# Patient Record
Sex: FEMALE | NOT HISPANIC OR LATINO | Employment: FULL TIME | URBAN - METROPOLITAN AREA
[De-identification: names, ages, dates, MRNs, and addresses within clinical notes are randomized per-mention and may not be internally consistent; named-entity substitution may affect disease eponyms.]

---

## 2018-04-03 ENCOUNTER — EVALUATION (OUTPATIENT)
Dept: PHYSICAL THERAPY | Facility: CLINIC | Age: 58
End: 2018-04-03
Payer: COMMERCIAL

## 2018-04-03 DIAGNOSIS — M25.551 RIGHT HIP PAIN: ICD-10-CM

## 2018-04-03 DIAGNOSIS — M25.511 RIGHT SHOULDER PAIN, UNSPECIFIED CHRONICITY: ICD-10-CM

## 2018-04-03 DIAGNOSIS — M54.2 NECK PAIN: Primary | ICD-10-CM

## 2018-04-03 PROCEDURE — 97140 MANUAL THERAPY 1/> REGIONS: CPT | Performed by: PHYSICAL THERAPIST

## 2018-04-03 PROCEDURE — 97110 THERAPEUTIC EXERCISES: CPT | Performed by: PHYSICAL THERAPIST

## 2018-04-03 PROCEDURE — G8982 BODY POS GOAL STATUS: HCPCS | Performed by: PHYSICAL THERAPIST

## 2018-04-03 PROCEDURE — 97162 PT EVAL MOD COMPLEX 30 MIN: CPT | Performed by: PHYSICAL THERAPIST

## 2018-04-03 PROCEDURE — G8981 BODY POS CURRENT STATUS: HCPCS | Performed by: PHYSICAL THERAPIST

## 2018-04-03 PROCEDURE — 97014 ELECTRIC STIMULATION THERAPY: CPT | Performed by: PHYSICAL THERAPIST

## 2018-04-03 NOTE — LETTER
2018    [address] [place] [id number]    Patient: Omer Powell   YOB: 1960   Date of Visit: 4/3/2018     Encounter Diagnosis     ICD-10-CM    1  Neck pain M54 2    2  Right shoulder pain, unspecified chronicity M25 511    3  Right hip pain M25 551        Dear Dr Caren Bowden:    Please review the attached Plan of Care from [facility] recent visit  Please verify that you agree therapy should continue by signing the attached document and sending it back to our office  If you have any questions or concerns, please don't hesitate to call  Sincerely,    Paris Shannon, PT      Referring Provider:      I certify that I have read the below Plan of Care and certify the need for these services furnished under this plan of treatment while under my care  Selam Medrano  [address] [place] [address]  VIA Facsimile: [phone]          PT Evaluation     Today's date: 4/3/2018  Patient name: Omer Powell  : 1960  MRN: [id number]  Referring provider: Maria Henry  Dx:   Encounter Diagnosis     ICD-10-CM    1  Neck pain M54 2    2  Right shoulder pain, unspecified chronicity M25 511    3  Right hip pain M25 551                   Assessment/Plan    Subjective:  Pt is a [address] old female who present to PT w/ neck, right shoulder and right hip pain  She reports she has been having this ongoing pain for some time  She reports her neck and right shoulder pain seem to occur together, and worsen when she is sitting at her desk for prolonged periods of time  She also notes she is unable to sleep on the affected shoulder as well  She reports she feels less of this neck and shoulder pain when she is not working  The right hip pain is ongoing  It is located along the course of the right superior lateral hip, above the area of the Gt  This pain is worse with walking or sitting from prolonged sitting    She also feels she is unable to get comfortable at night to sleep if she lays on the affected side  The neck pain is located along the course of the right UT and LC muscles  Objective     1  The patient reports pain scale as 3-7/10   --Description:  Sharp, achy, tight   --Aggravated:  Prolonged sitting at her work station   --Relieved:  Resting or not working   2  Patient observation reveals forward head and accentuated lordosis   3  Palpation for pain was positive over the right UT and LS muscles; C6 Sp; right GT bursa   4  Reflex testing revealed the following:    --C5:  2+/2+   --C6:  2+/2+   --C7:  2+/2+  5  Manual muscle testing revealed the following:    --C5:  4/5 / 4/5   --C6:  4/5 / 4/5   --C7:  4-/5 / 4-/5  6  Sensation was intact to light touch  7   Functional testing revealed the patient's Neck Disability Index to be 22/50   8  Upper Quarter Screen was intact for all myotomes tested  9   Active Range of Motion of the cervical spine was as follows:    --Flexion:  -2" chin to chest w/ mild right stretch pain w/ OP   --Extension:  -50% w/ mild increased right sided neck and shoulder pain w/ quadrant OP   --Right sidebending:  -50% w/ increased shoulder pain w/ OP   --Left sidebending:  -50% w/ increased right tightness w/ OP   --Right rotation:  -50% w/ increased right shoulder pain w/ OP   --Left rotation:  -25% w/ mild right tightness w/ OP  10   Special testing revealed the following:    --Acute Radiculopathy Screen:    1  Spurling's:  +/-    2  Distraction:  -/-    3   ULTT:  -/-   --Shoulder Screen:  +/-    Patient's rehabilitation potential is good to achieve the following functional goals by attending physical therapy for 12 visits:  1  The patient will report their pain to be 0-1/10   2  The patient will be able to sit for at least 30 minutes without complaints of increased symptoms  3   The patient will be able to perform all ADL's independently and without fear of re-injury    4   The patient will be able to rise from a seated position one time every 30 minutes without difficulty or increased pain  5   The patient will be able to perform all work requirements without restrictions  6   The patient will be able to lift at least 25 pounds from the floor to waist level one time every 30 minutes without any difficulty or fear of re-injury  7   The patient will be able to sleep at least 6 hours undisturbed each night  8   The patient will be able to return to all reasonable leisure activities without restrictions  9   The patient's will have negative palpation for tenderness  10   The patient's manual muscle test will be within normal limits for all myotomes tested  11  The patient's active range of motion will be within normal limits for all ranges tested  12  The patient's Neck Disability Index will be 0-5/50  13  The patient will be independent with their home exercise program     Assessment:     Thank you for the recent referral of Aspen Arce  As you know, this patient is a 56 y o  female who desires to return to a pain-free and fully functional lifestyle  Physical examination has found the patient to have Movement Impairment Diagnosis extension dysfunction that has resulted in pathoanatomical symptoms of Neck and shoulder pain associated with multiple functional limitations  Etiological variables appear to be postural, age-related, and vocational in nature  Rehabilitation goals include decreasing pain, increasing range of motion, increasing strength, improving biomechanics, endurance, posture, and functional tolerances to be within normal limits and consistent with patient's activities of daily living  In addition, the patient will be instructed in proper and safe body mechanics and provided an independent home exercise program to complement their outpatient physical therapy  I sincerely enjoyed participating in the care of this patient        Plan: treatment will include, but not be limited to the following:    Cryotherapy and/or Hot packs, Electrical stimulation and Mechanical traction to control for pain, inflammation, and/or edema  Joint Mobilization Techniques, Soft Tissue Mobilization Techniques and PROM/AAROM/AROM exercises to restore joint mobility/flexibility  Stabilization/strengthening exercises to restore core stability/strength  Instruction and Progression of a home exercise program to address the above impairments  Postural education  Ergonomic/ training  I have fully discussed the above treatment plan and expected outcomes with the patient  She is aware of the diagnosis and prognosis and has voluntarily agreed to participate in physical and/or occupational therapy            Total Time: 60      Daily Treatment Diary     Manual  4/2            AP C/S 10'            TPR R UT and GM 10'            CPA C6-T2 10'                                          Exercise Diary              SOC 15x:10            DNF 10x:05            Rot R & L 10x:05            PF str 3x:30            No $ 2x15                                                                                                                                                                                                                   Modalities              MH/TENS 15'

## 2018-04-03 NOTE — PROGRESS NOTES
PT Evaluation     Today's date: 4/3/2018  Patient name: Omer Powell  : 1960  MRN: [id number]  Referring provider: Maria Henry  Dx:   Encounter Diagnosis     ICD-10-CM    1  Neck pain M54 2    2  Right shoulder pain, unspecified chronicity M25 511    3  Right hip pain M25 551                   Assessment/Plan    Subjective:  Pt is a 56year old female who present to PT w/ neck, right shoulder and right hip pain  She reports she has been having this ongoing pain for some time  She reports her neck and right shoulder pain seem to occur together, and worsen when she is sitting at her desk for prolonged periods of time  She also notes she is unable to sleep on the affected shoulder as well  She reports she feels less of this neck and shoulder pain when she is not working  The right hip pain is ongoing  It is located along the course of the right superior lateral hip, above the area of the Gt  This pain is worse with walking or sitting from prolonged sitting  She also feels she is unable to get comfortable at night to sleep if she lays on the affected side  The neck pain is located along the course of the right UT and LC muscles  Objective     1  The patient reports pain scale as 3-7/10   --Description:  Sharp, achy, tight   --Aggravated:  Prolonged sitting at her work station   --Relieved:  Resting or not working   2  Patient observation reveals forward head and accentuated lordosis   3  Palpation for pain was positive over the right UT and LS muscles; C6 Sp; right GT bursa   4  Reflex testing revealed the following:    --C5:  2+/2+   --C6:  2+/2+   --C7:  2+/2+  5  Manual muscle testing revealed the following:    --C5:  4/5 / 4/5   --C6:  4/5 / 4/5   --C7:  4-/5 / 4-/5  6  Sensation was intact to light touch  7   Functional testing revealed the patient's Neck Disability Index to be 22/50   8  Upper Quarter Screen was intact for all myotomes tested    9   Active Range of Motion of the cervical spine was as follows:    --Flexion:  -2" chin to chest w/ mild right stretch pain w/ OP   --Extension:  -50% w/ mild increased right sided neck and shoulder pain w/ quadrant OP   --Right sidebending:  -50% w/ increased shoulder pain w/ OP   --Left sidebending:  -50% w/ increased right tightness w/ OP   --Right rotation:  -50% w/ increased right shoulder pain w/ OP   --Left rotation:  -25% w/ mild right tightness w/ OP  10   Special testing revealed the following:    --Acute Radiculopathy Screen:    1  Spurling's:  +/-    2  Distraction:  -/-    3   ULTT:  -/-   --Shoulder Screen:  +/-    Patient's rehabilitation potential is good to achieve the following functional goals by attending physical therapy for 12 visits:  1  The patient will report their pain to be 0-1/10   2  The patient will be able to sit for at least 30 minutes without complaints of increased symptoms  3   The patient will be able to perform all ADL's independently and without fear of re-injury  4   The patient will be able to rise from a seated position one time every 30 minutes without difficulty or increased pain  5   The patient will be able to perform all work requirements without restrictions  6   The patient will be able to lift at least 25 pounds from the floor to waist level one time every 30 minutes without any difficulty or fear of re-injury  7   The patient will be able to sleep at least 6 hours undisturbed each night  8   The patient will be able to return to all reasonable leisure activities without restrictions  9   The patient's will have negative palpation for tenderness  10   The patient's manual muscle test will be within normal limits for all myotomes tested  11  The patient's active range of motion will be within normal limits for all ranges tested  12  The patient's Neck Disability Index will be 0-5/50  13    The patient will be independent with their home exercise program     Assessment:     Thank you for the recent referral of Mar Figueroa  As you know, this patient is a 56 y o  female who desires to return to a pain-free and fully functional lifestyle  Physical examination has found the patient to have Movement Impairment Diagnosis extension dysfunction that has resulted in pathoanatomical symptoms of Neck and shoulder pain associated with multiple functional limitations  Etiological variables appear to be postural, age-related, and vocational in nature  Rehabilitation goals include decreasing pain, increasing range of motion, increasing strength, improving biomechanics, endurance, posture, and functional tolerances to be within normal limits and consistent with patient's activities of daily living  In addition, the patient will be instructed in proper and safe body mechanics and provided an independent home exercise program to complement their outpatient physical therapy  I sincerely enjoyed participating in the care of this patient  Plan: treatment will include, but not be limited to the following:    Cryotherapy and/or Hot packs, Electrical stimulation and Mechanical traction to control for pain, inflammation, and/or edema  Joint Mobilization Techniques, Soft Tissue Mobilization Techniques and PROM/AAROM/AROM exercises to restore joint mobility/flexibility  Stabilization/strengthening exercises to restore core stability/strength  Instruction and Progression of a home exercise program to address the above impairments  Postural education  Ergonomic/ training  I have fully discussed the above treatment plan and expected outcomes with the patient  She is aware of the diagnosis and prognosis and has voluntarily agreed to participate in physical and/or occupational therapy            Total Time: 60      Daily Treatment Diary     Manual  4/2            AP C/S 10'            TPR R UT and GM 10'            CPA C6-T2 10'                                          Exercise Diary SOC 15x:10            DNF 10x:05            Rot R & L 10x:05            PF str 3x:30            No $ 2x15                                                                                                                                                                                                                   Modalities              MH/TENS 15'

## 2018-04-03 NOTE — LETTER
2018    [address] [place] [address]  [address]  [facility] [address]    Patient: Omer Powell   YOB: 1960   Date of Visit: 4/3/2018     Encounter Diagnosis     ICD-10-CM    1  Neck pain M54 2    2  Right shoulder pain, unspecified chronicity M25 511    3  Right hip pain M25 551        Dear Dr Caren Bowden:    Please review the attached Plan of Care from [facility] recent visit  Please verify that you agree therapy should continue by signing the attached document and sending it back to our office  If you have any questions or concerns, please don't hesitate to call  Sincerely,    Paris Shannon, PT      Referring Provider:      I certify that I have read the below Plan of Care and certify the need for these services furnished under this plan of treatment while under my care  [address] [place] [address]  [address] [place] [address]  VIA Facsimile: [phone]          PT Evaluation     Today's date: 4/3/2018  Patient name: Omer Powell  : 1960  MRN: [id number]  Referring provider: Maria Henry  Dx:   Encounter Diagnosis     ICD-10-CM    1  Neck pain M54 2    2  Right shoulder pain, unspecified chronicity M25 511    3  Right hip pain M25 551                   Assessment/Plan    Subjective:  Pt is a 56year old female who present to PT w/ neck, right shoulder and right hip pain  She reports she has been having this ongoing pain for some time  She reports her neck and right shoulder pain seem to occur together, and worsen when she is sitting at her desk for prolonged periods of time  She also notes she is unable to sleep on the affected shoulder as well  She reports she feels less of this neck and shoulder pain when she is not working  The right hip pain is ongoing  It is located along the course of the right superior lateral hip, above the area of the Gt  This pain is worse with walking or sitting from prolonged sitting    She also feels she is unable to get comfortable at night to sleep if she lays on the affected side  The neck pain is located along the course of the right UT and LC muscles  Objective     1  The patient reports pain scale as 3-7/10   --Description:  Sharp, achy, tight   --Aggravated:  Prolonged sitting at her work station   --Relieved:  Resting or not working   2  Patient observation reveals forward head and accentuated lordosis   3  Palpation for pain was positive over the right UT and LS muscles; C6 Sp; right GT bursa   4  Reflex testing revealed the following:    --C5:  2+/2+   --C6:  2+/2+   --C7:  2+/2+  5  Manual muscle testing revealed the following:    --C5:  4/5 / 4/5   --C6:  4/5 / 4/5   --C7:  4-/5 / 4-/5  6  Sensation was intact to light touch  7   Functional testing revealed the patient's Neck Disability Index to be 22/50   8  Upper Quarter Screen was intact for all myotomes tested  9   Active Range of Motion of the cervical spine was as follows:    --Flexion:  -2" chin to chest w/ mild right stretch pain w/ OP   --Extension:  -50% w/ mild increased right sided neck and shoulder pain w/ quadrant OP   --Right sidebending:  -50% w/ increased shoulder pain w/ OP   --Left sidebending:  -50% w/ increased right tightness w/ OP   --Right rotation:  -50% w/ increased right shoulder pain w/ OP   --Left rotation:  -25% w/ mild right tightness w/ OP  10   Special testing revealed the following:    --Acute Radiculopathy Screen:    1  Spurling's:  +/-    2  Distraction:  -/-    3   ULTT:  -/-   --Shoulder Screen:  +/-    Patient's rehabilitation potential is good to achieve the following functional goals by attending physical therapy for 12 visits:  1  The patient will report their pain to be 0-1/10   2  The patient will be able to sit for at least 30 minutes without complaints of increased symptoms  3   The patient will be able to perform all ADL's independently and without fear of re-injury    4   The patient will be able to rise from a seated position one time every 30 minutes without difficulty or increased pain  5   The patient will be able to perform all work requirements without restrictions  6   The patient will be able to lift at least 25 pounds from the floor to waist level one time every 30 minutes without any difficulty or fear of re-injury  7   The patient will be able to sleep at least 6 hours undisturbed each night  8   The patient will be able to return to all reasonable leisure activities without restrictions  9   The patient's will have negative palpation for tenderness  10   The patient's manual muscle test will be within normal limits for all myotomes tested  11  The patient's active range of motion will be within normal limits for all ranges tested  12  The patient's Neck Disability Index will be 0-5/50  13  The patient will be independent with their home exercise program     Assessment:     Thank you for the recent referral of Aspen Arce  As you know, this patient is a 56 y o  female who desires to return to a pain-free and fully functional lifestyle  Physical examination has found the patient to have Movement Impairment Diagnosis extension dysfunction that has resulted in pathoanatomical symptoms of Neck and shoulder pain associated with multiple functional limitations  Etiological variables appear to be postural, age-related, and vocational in nature  Rehabilitation goals include decreasing pain, increasing range of motion, increasing strength, improving biomechanics, endurance, posture, and functional tolerances to be within normal limits and consistent with patient's activities of daily living  In addition, the patient will be instructed in proper and safe body mechanics and provided an independent home exercise program to complement their outpatient physical therapy  I sincerely enjoyed participating in the care of this patient        Plan: treatment will include, but not be limited to the following:    Cryotherapy and/or Hot packs, Electrical stimulation and Mechanical traction to control for pain, inflammation, and/or edema  Joint Mobilization Techniques, Soft Tissue Mobilization Techniques and PROM/AAROM/AROM exercises to restore joint mobility/flexibility  Stabilization/strengthening exercises to restore core stability/strength  Instruction and Progression of a home exercise program to address the above impairments  Postural education  Ergonomic/ training  I have fully discussed the above treatment plan and expected outcomes with the patient  She is aware of the diagnosis and prognosis and has voluntarily agreed to participate in physical and/or occupational therapy            Total Time: 60      Daily Treatment Diary     Manual  4/2            AP C/S 10'            TPR R UT and GM 10'            CPA C6-T2 10'                                          Exercise Diary              SOC 15x:10            DNF 10x:05            Rot R & L 10x:05            PF str 3x:30            No $ 2x15                                                                                                                                                                                                                   Modalities              MH/TENS 15'

## 2018-04-04 ENCOUNTER — TRANSCRIBE ORDERS (OUTPATIENT)
Dept: PHYSICAL THERAPY | Facility: CLINIC | Age: 58
End: 2018-04-04

## 2018-04-04 ENCOUNTER — OFFICE VISIT (OUTPATIENT)
Dept: PHYSICAL THERAPY | Facility: CLINIC | Age: 58
End: 2018-04-04
Payer: COMMERCIAL

## 2018-04-04 DIAGNOSIS — M54.2 NECK PAIN: Primary | ICD-10-CM

## 2018-04-04 DIAGNOSIS — M25.511 RIGHT SHOULDER PAIN, UNSPECIFIED CHRONICITY: ICD-10-CM

## 2018-04-04 DIAGNOSIS — M25.551 RIGHT HIP PAIN: ICD-10-CM

## 2018-04-04 PROCEDURE — 97014 ELECTRIC STIMULATION THERAPY: CPT | Performed by: PHYSICAL THERAPIST

## 2018-04-04 PROCEDURE — 97110 THERAPEUTIC EXERCISES: CPT | Performed by: PHYSICAL THERAPIST

## 2018-04-04 PROCEDURE — 97140 MANUAL THERAPY 1/> REGIONS: CPT | Performed by: PHYSICAL THERAPIST

## 2018-04-04 NOTE — PROGRESS NOTES
Daily Note     Today's date: 2018  Patient name: Junior Estrada  : 1960  MRN: [id number]  Referring provider: Kj Herrera  Dx:   Encounter Diagnosis     ICD-10-CM    1  Neck pain M54 2    2  Right shoulder pain, unspecified chronicity M25 511    3  Right hip pain M25 551                 Visit 2    Subjective: Pt reports she felt really good, but her position at work is what really increases her pain  Objective: See treatment diary below  The patient performed the following as per flow sheet:   --Therapeutic Exercises for 20 minutes  --Manual Therapy for 30 minutes  --Moist Heat and TENS for 15 minutes  Assessment: Pt progress is good  Once we get her symptoms to calm down a little, adjusting her work station will be the thing which will continue to keep her symptoms manageable and pain less so she can work without too much discomfort  Plan: Continue per plan of care       Manual                       AP C/S 10'                     TPR R UT and GM 10'                     CPA C6-T2 10'                                                                           Exercise Diary                        SOC 15x:10                     DNF 10x:05                     Rot R & L 10x:05                     PF str 3x:30                     No $ 2x15                     Sup protr 2x15                     FR str 2'                                                                                                                                                                                                                                                                                                                                                   Modalities                        MH/TENS 13'

## 2018-04-09 ENCOUNTER — OFFICE VISIT (OUTPATIENT)
Dept: PHYSICAL THERAPY | Facility: CLINIC | Age: 58
End: 2018-04-09
Payer: COMMERCIAL

## 2018-04-09 DIAGNOSIS — M25.551 RIGHT HIP PAIN: ICD-10-CM

## 2018-04-09 DIAGNOSIS — M54.2 NECK PAIN: Primary | ICD-10-CM

## 2018-04-09 DIAGNOSIS — M25.511 RIGHT SHOULDER PAIN, UNSPECIFIED CHRONICITY: ICD-10-CM

## 2018-04-09 PROCEDURE — 97140 MANUAL THERAPY 1/> REGIONS: CPT | Performed by: PHYSICAL THERAPIST

## 2018-04-09 PROCEDURE — 97110 THERAPEUTIC EXERCISES: CPT | Performed by: PHYSICAL THERAPIST

## 2018-04-09 PROCEDURE — 97014 ELECTRIC STIMULATION THERAPY: CPT | Performed by: PHYSICAL THERAPIST

## 2018-04-09 NOTE — PROGRESS NOTES
Daily Note     Today's date: 2018  Patient name: Ragini Tatum  : 1960  MRN: [id number]  Referring provider: Ephraim Martinez  Dx: No diagnosis found  Visit 3    Subjective: Pt reports she is feeling better but her job duties which require a lot of sitting seems to affect her pain the most       Objective: See treatment diary below  The patient performed the following as per flow sheet:   --Therapeutic Exercises for 23 minutes  --Manual Therapy for 30 minutes  --Moist Heat and TENS for 15 minutes  Assessment: Pt progress is good  She continues to make progress and is seeing less pain during the day  Her work station is something that will need to be addressed at some point by her to her company, in order to decrease the amount of sitting with forward head posturing she does  Plan: Continue per plan of care       Manual                       AP C/S 10'                     TPR R UT and GM 10'                     CPA C6-T2 10'                                                                           Exercise Diary                        SOC 15x:10                     DNF 10x:05                     Rot R & L 10x:05                     PF str 3x:30                     No $ 2x15                     Sup protr 2x15                     FR str 2'                     Prone scap 2x15                                                                                                                                                                                                                                                                                                                           Modalities                        MH/TENS 13'

## 2018-04-11 ENCOUNTER — OFFICE VISIT (OUTPATIENT)
Dept: PHYSICAL THERAPY | Facility: CLINIC | Age: 58
End: 2018-04-11
Payer: COMMERCIAL

## 2018-04-11 DIAGNOSIS — M25.511 RIGHT SHOULDER PAIN, UNSPECIFIED CHRONICITY: ICD-10-CM

## 2018-04-11 DIAGNOSIS — M25.551 RIGHT HIP PAIN: ICD-10-CM

## 2018-04-11 DIAGNOSIS — M54.2 NECK PAIN: Primary | ICD-10-CM

## 2018-04-11 PROCEDURE — 97014 ELECTRIC STIMULATION THERAPY: CPT | Performed by: PHYSICAL THERAPIST

## 2018-04-11 PROCEDURE — 97140 MANUAL THERAPY 1/> REGIONS: CPT | Performed by: PHYSICAL THERAPIST

## 2018-04-11 PROCEDURE — 97110 THERAPEUTIC EXERCISES: CPT | Performed by: PHYSICAL THERAPIST

## 2018-04-11 NOTE — PROGRESS NOTES
Daily Note     Today's date: 2018  Patient name: Jj Geronimo  : 1960  MRN: [id number]  Referring provider: Shagufta Morelos  Dx:   Encounter Diagnosis     ICD-10-CM    1  Neck pain M54 2    2  Right shoulder pain, unspecified chronicity M25 511    3  Right hip pain M25 551                 Visit 4    C/O= Subjective Assessment:    Pt reports she is feeling good today  She notices that her pain worsens with long work days and missing too many day in between PT     O/E = Objective Assessment:    Pt presents with pain reproducible with CPA C6-T2 and TPR    The patient performed the following as per flow sheet:   --Therapeutic Exercises for 27 minutes  --Manual Therapy for 30 minutes  --Moist Heat and TENS for 15 minutes  PLAN =  CPA C6-T2 and TPR      Present Pain =  4/10     The Treatment    1  Technique used:  CPA and TPR    2   Grade used:  3++    3  The level the technique was used:  C6-T2    4  The number of times completed:  150    5  The effect it had on the patient while it was performed:  TTP noted over the right QL and LC muscles       Effect of Treatment    1  C/O =  I feel much better      2  O/E =  Less TTP reported and noted after [address] = Any treatment changes or reminders for next session:  Reassess TTP and NPRS    Total Time: 72 minutes      Manual                       AP C/S 10'                     TPR R UT and GM 10'                     CPA C6-T2 10'                                                                           Exercise Diary                        SOC 15x:10                     DNF 10x:05                     Rot R & L 10x:05                     PF str 3x:30                     No $ 2x15                     Sup protr 2x15                     FR str 2'                     Prone scap 2x15                     TT ext 2x15                                                                                                                                                                                                                                                                                                   Modalities                        MH/TENS 13'

## 2018-04-16 ENCOUNTER — OFFICE VISIT (OUTPATIENT)
Dept: PHYSICAL THERAPY | Facility: CLINIC | Age: 58
End: 2018-04-16
Payer: COMMERCIAL

## 2018-04-16 DIAGNOSIS — M25.551 RIGHT HIP PAIN: ICD-10-CM

## 2018-04-16 DIAGNOSIS — M54.2 NECK PAIN: Primary | ICD-10-CM

## 2018-04-16 DIAGNOSIS — M25.511 RIGHT SHOULDER PAIN, UNSPECIFIED CHRONICITY: ICD-10-CM

## 2018-04-16 PROCEDURE — 97140 MANUAL THERAPY 1/> REGIONS: CPT | Performed by: PHYSICAL THERAPIST

## 2018-04-16 PROCEDURE — 97110 THERAPEUTIC EXERCISES: CPT | Performed by: PHYSICAL THERAPIST

## 2018-04-16 PROCEDURE — 97014 ELECTRIC STIMULATION THERAPY: CPT | Performed by: PHYSICAL THERAPIST

## 2018-04-16 NOTE — PROGRESS NOTES
Daily Note     Today's date: 2018  Patient name: Gayatri Lagos  : 1960  MRN: [id number]  Referring provider: Jazmyn Giron  Dx:   Encounter Diagnosis     ICD-10-CM    1  Neck pain M54 2    2  Right shoulder pain, unspecified chronicity M25 511    3  Right hip pain M25 551                 Visit 5    C/O= Subjective Assessment:    Pt reports she is feeling pretty good but she feels a little ill from fibromyalgia reaction the day after PT     O/E = Objective Assessment:    Pt presents with pain reproducible with CPA C6-T2 and TPR    The patient performed the following as per flow sheet:   --Therapeutic Exercises for 27 minutes  --Manual Therapy for 30 minutes  --Moist Heat and TENS for 15 minutes  PLAN =  CPA C6-T2 and TPR      Present Pain =  3-410     The Treatment    1  Technique used:  CPA and TPR    2   Grade used:  3++    3  The level the technique was used:  C6-T2     4  The number of times completed:  150    5  The effect it had on the patient while it was performed:  TTP over the right LC, UT and Gmed muscles      Effect of Treatment    1  C/O =  I feel better      2  O/E =  TTP is lessening as TX continues       PLAN = Any treatment changes or reminders for next session:  Reassess TTP and NPRS    Total Time: 72 minutes      Manual                       AP C/S 10'                     TPR R UT and GM 10'                     CPA C6-T2 10'                                                                           Exercise Diary                        SOC 15x:10                     DNF 10x:05                     Rot R & L 10x:05                     PF str 3x:30                     No $ 2x15                     Sup protr 2x15                     FR str 2'                     Prone scap 2x15                     TT ext 2x15                                                                                                                                                                                                                                                                                                   Modalities                        MH/TENS 13'

## 2018-04-18 ENCOUNTER — OFFICE VISIT (OUTPATIENT)
Dept: PHYSICAL THERAPY | Facility: CLINIC | Age: 58
End: 2018-04-18
Payer: COMMERCIAL

## 2018-04-18 DIAGNOSIS — M25.511 RIGHT SHOULDER PAIN, UNSPECIFIED CHRONICITY: ICD-10-CM

## 2018-04-18 DIAGNOSIS — M54.2 NECK PAIN: Primary | ICD-10-CM

## 2018-04-18 DIAGNOSIS — M25.551 RIGHT HIP PAIN: ICD-10-CM

## 2018-04-18 PROCEDURE — 97140 MANUAL THERAPY 1/> REGIONS: CPT | Performed by: PHYSICAL THERAPIST

## 2018-04-18 PROCEDURE — 97014 ELECTRIC STIMULATION THERAPY: CPT | Performed by: PHYSICAL THERAPIST

## 2018-04-18 PROCEDURE — 97110 THERAPEUTIC EXERCISES: CPT | Performed by: PHYSICAL THERAPIST

## 2018-04-18 NOTE — PROGRESS NOTES
Daily Note     Today's date: 2018  Patient name: Kathy De Santiago  : 1960  MRN: [id number]  Referring provider: Cristobal Terrazas  Dx:   Encounter Diagnosis     ICD-10-CM    1  Neck pain M54 2    2  Right shoulder pain, unspecified chronicity M25 511    3  Right hip pain M25 551                 Visit 6    C/O= Subjective Assessment:    Pt reports she is feeling a little sore today because of the long days she has been having at work  O/E = Objective Assessment:    Pt presents with pain reproducible with CPA C6-T2 and TPR    The patient performed the following as per flow sheet:   --Therapeutic Exercises for 30 minutes  --Manual Therapy for 30 minutes  --Moist Heat and TENS for 15 minutes  PLAN =  CPA C6-T2 and TPR      Present Pain =  4/10     The Treatment    1  Technique used:  CPA and TPR    2   Grade used:  3++    3  The level the technique was used:  C6-T2    4  The number of times completed:  150    5  The effect it had on the patient while it was performed:  TTP over the right UT and right Gmed muscles      Effect of Treatment    1  C/O =  I feel a little sore over my right hip      2  O/E =  Increased strength of hip abd noted      PLAN = Any treatment changes or reminders for next session:  Reassess symptoms response and NPRS    Total Time: 75 minutes      Manual                       AP C/S 10'                     TPR R UT and GM 10'                     CPA C6-T2 10'                                                                           Exercise Diary                        SOC 15x:10                     DNF 10x:05                     Rot R & L 10x:05                     PF str 3x:30                     No $ 2x15                     Sup protr 2x15                     FR str 2'                     Prone scap 2x15                     TT ext 2x15                     Clam shell 2x15                                                                                                                                                                                                                                                                           Modalities                        MH/TENS 13'

## 2018-04-23 ENCOUNTER — OFFICE VISIT (OUTPATIENT)
Dept: PHYSICAL THERAPY | Facility: CLINIC | Age: 58
End: 2018-04-23
Payer: COMMERCIAL

## 2018-04-23 DIAGNOSIS — M25.551 RIGHT HIP PAIN: ICD-10-CM

## 2018-04-23 DIAGNOSIS — M25.511 RIGHT SHOULDER PAIN, UNSPECIFIED CHRONICITY: ICD-10-CM

## 2018-04-23 DIAGNOSIS — M54.2 NECK PAIN: Primary | ICD-10-CM

## 2018-04-23 PROCEDURE — 97014 ELECTRIC STIMULATION THERAPY: CPT | Performed by: PHYSICAL THERAPIST

## 2018-04-23 PROCEDURE — 97110 THERAPEUTIC EXERCISES: CPT | Performed by: PHYSICAL THERAPIST

## 2018-04-23 PROCEDURE — 97140 MANUAL THERAPY 1/> REGIONS: CPT | Performed by: PHYSICAL THERAPIST

## 2018-04-23 NOTE — PROGRESS NOTES
Daily Note     Today's date: 2018  Patient name: Trae Rowell  : 1960  MRN: [id number]  Referring provider: Guzman Palencia  Dx:   Encounter Diagnosis     ICD-10-CM    1  Neck pain M54 2    2  Right shoulder pain, unspecified chronicity M25 511    3  Right hip pain M25 551                 Visit 7    C/O= Subjective Assessment:    Pt reports she had a long and difficult day at work today but her neck was not as sore as before  O/E = Objective Assessment:    Pt presents with pain reproducible with CPA C6-T2 and TPR    The patient performed the following as per flow sheet:   --Therapeutic Exercises for 30 minutes  --Manual Therapy for 30 minutes  --Moist Heat and TENS for 15 minutes  PLAN =  CPA C6-T2 and TPR      Present Pain =  4/10     The Treatment    1  Technique used:  CPA and TPR    2   Grade used:  3++    3  The level the technique was used:  C6-T2    4  The number of times completed:  150    5  The effect it had on the patient while it was performed:  TTP over the [facility] and UT B/L      Effect of Treatment    1  C/O =  I feel pretty good      2  O/E =  Minimal TTP after Tx; symptoms abating during work      PLAN = Any treatment changes or reminders for next session:  Reassess symptoms response and NPRS    Total Time: 75 minutes      Manual                       AP C/S 10'                     TPR R UT and GM 10'                     CPA C6-T2 10'                                                                           Exercise Diary                        SOC 15x:10                     DNF 10x:05                     Rot R & L 10x:05                     PF str 3x:30                     No $ 2x15                     Sup protr 2x15                     FR str 2'                     Prone scap 2x15                     TT ext 2x15                     Clam shell 2x15                                                                                                                                                                                                                                                                           Modalities                        MH/TENS 13'

## 2018-04-25 ENCOUNTER — OFFICE VISIT (OUTPATIENT)
Dept: PHYSICAL THERAPY | Facility: CLINIC | Age: 58
End: 2018-04-25
Payer: COMMERCIAL

## 2018-04-25 DIAGNOSIS — M25.551 RIGHT HIP PAIN: ICD-10-CM

## 2018-04-25 DIAGNOSIS — M25.511 RIGHT SHOULDER PAIN, UNSPECIFIED CHRONICITY: ICD-10-CM

## 2018-04-25 DIAGNOSIS — M54.2 NECK PAIN: Primary | ICD-10-CM

## 2018-04-25 PROCEDURE — 97140 MANUAL THERAPY 1/> REGIONS: CPT | Performed by: PHYSICAL THERAPIST

## 2018-04-25 PROCEDURE — 97110 THERAPEUTIC EXERCISES: CPT | Performed by: PHYSICAL THERAPIST

## 2018-04-25 PROCEDURE — 97014 ELECTRIC STIMULATION THERAPY: CPT | Performed by: PHYSICAL THERAPIST

## 2018-04-25 NOTE — PROGRESS NOTES
Daily Note     Today's date: 2018  Patient name: King Gross  : 1960  MRN: [id number]  Referring provider: Lele Waters  Dx:   Encounter Diagnosis     ICD-10-CM    1  Neck pain M54 2    2  Right shoulder pain, unspecified chronicity M25 511    3  Right hip pain M25 551                 Visit 8    Subjective: Pt reports she feels Ok today  She has more right hip pain than she usually does  Objective: See treatment diary below  The patient performed the following as per flow sheet:   --Therapeutic Exercises for 30 minutes  --Manual Therapy for 30 minutes  --Moist Heat and TENS for 15 minutes  Assessment: Pt progress is good  She has persistent hip pain and right UT trap  The hip pain is from too much inactivity, and the right UT pain is from poor work ergonomics  We are treating both, but there seems to be a lack of commitment to change the causes of her pain  Plan: Continue per plan of care       Manual                       AP C/S 10'                     TPR R UT and GM 10'                     CPA C6-T2 10'                                                                           Exercise Diary                        SOC 15x:10                     DNF 10x:05                     Rot R & L 10x:05                     PF str 3x:30                     No $ 2x15                     Sup protr 2x15                     FR str 2'                     Prone scap 2x15                     TT ext 2x15                     Clam shell 2x15                                                                                                                                                                                                                                                                           Modalities                        MH/TENS 13'

## 2018-04-30 ENCOUNTER — OFFICE VISIT (OUTPATIENT)
Dept: PHYSICAL THERAPY | Facility: CLINIC | Age: 58
End: 2018-04-30
Payer: COMMERCIAL

## 2018-04-30 DIAGNOSIS — M54.2 NECK PAIN: Primary | ICD-10-CM

## 2018-04-30 DIAGNOSIS — M25.551 RIGHT HIP PAIN: ICD-10-CM

## 2018-04-30 DIAGNOSIS — M25.511 RIGHT SHOULDER PAIN, UNSPECIFIED CHRONICITY: ICD-10-CM

## 2018-04-30 PROCEDURE — 97140 MANUAL THERAPY 1/> REGIONS: CPT | Performed by: PHYSICAL THERAPIST

## 2018-04-30 PROCEDURE — 97110 THERAPEUTIC EXERCISES: CPT | Performed by: PHYSICAL THERAPIST

## 2018-04-30 PROCEDURE — 97014 ELECTRIC STIMULATION THERAPY: CPT | Performed by: PHYSICAL THERAPIST

## 2018-04-30 NOTE — PROGRESS NOTES
Daily Note     Today's date: 2018  Patient name: Pinky Rodriguez  : 1960  MRN: [id number]  Referring provider: Anni Hsu  Dx:   Encounter Diagnosis     ICD-10-CM    1  Neck pain M54 2    2  Right shoulder pain, unspecified chronicity M25 511    3  Right hip pain M25 551                 Visit 9    C/O= Subjective Assessment:    Pt reports she is feeling better today and she has less pain over this past weekend  O/E = Objective Assessment:    Pt presents with pain reproducible with CPA L2-L5 and TPR    The patient performed the following as per flow sheet:   --Therapeutic Exercises for 30 minutes  --Manual Therapy for 30 minutes  --Moist Heat and TENS for 15 minutes  PLAN =  CPA L2-L5 and TPR      Present Pain =  3-410     The Treatment    1  Technique used:  CPA and TPR    2   Grade used:  3++    3  The level the technique was used:  C6-T2    4  The number of times completed:  150    5  The effect it had on the patient while it was performed:  TTP over the right LC, UT and Gmed muscles       Effect of Treatment    1  C/O =  I fell pretty good      2  O/E =  Less TTP noted before and after [address] = Any treatment changes or reminders for next session:  Reassess symptoms response and NPRS    Total Time: 75 minutes      Manual  4/30                     AP C/S 10'                     TPR R UT and GM 10'                     CPA C6-T2 10'                                                                           Exercise Diary                        SOC 15x:10                     DNF 10x:05                     Rot R & L 10x:05                     PF str 3x:30                     No $ 2x15                     Sup protr 2x15                     FR str 2'                     Prone scap 2x15                     TT ext 2x15                     Clam shell 2x15                                                                                                                                                                                                                                                                           Modalities                        MH/TENS 13'

## 2018-05-02 ENCOUNTER — OFFICE VISIT (OUTPATIENT)
Dept: PHYSICAL THERAPY | Facility: CLINIC | Age: 58
End: 2018-05-02
Payer: COMMERCIAL

## 2018-05-02 DIAGNOSIS — M25.551 RIGHT HIP PAIN: ICD-10-CM

## 2018-05-02 DIAGNOSIS — M54.2 NECK PAIN: Primary | ICD-10-CM

## 2018-05-02 DIAGNOSIS — M25.511 RIGHT SHOULDER PAIN, UNSPECIFIED CHRONICITY: ICD-10-CM

## 2018-05-02 PROCEDURE — 97110 THERAPEUTIC EXERCISES: CPT | Performed by: PHYSICAL THERAPIST

## 2018-05-02 PROCEDURE — 97014 ELECTRIC STIMULATION THERAPY: CPT | Performed by: PHYSICAL THERAPIST

## 2018-05-02 PROCEDURE — 97140 MANUAL THERAPY 1/> REGIONS: CPT | Performed by: PHYSICAL THERAPIST

## 2018-05-02 NOTE — PROGRESS NOTES
Daily Note     Today's date: 2018  Patient name: Jj Geronimo  : 1960  MRN: [id number]  Referring provider: Shagufta Morelos  Dx:   Encounter Diagnosis     ICD-10-CM    1  Neck pain M54 2    2  Right shoulder pain, unspecified chronicity M25 511    3  Right hip pain M25 551                 Visit 10    C/O= Subjective Assessment:    Pt reports she is feeling good and she notices less pain while she is at work most of all  O/E = Objective Assessment:    Pt presents with pain reproducible with CPA C6-T2 and TPR    The patient performed the following as per flow sheet:   --Therapeutic Exercises for 30 minutes  --Manual Therapy for 30 minutes  --Moist Heat and TENS for 15 minutes  PLAN =  CPA C6-T2 and TPR      Present Pain =  2/10     The Treatment    1  Technique used:  CPA and TPR    2   Grade used:  3++    3  The level the technique was used:  C6-T2    4  The number of times completed:  150    5  The effect it had on the patient while it was performed:  TTP over the right LC, UT, and PF muscles       Effect of Treatment    1  C/O =  I feel good      2  O/E =  Less TTP noted after [address] = Any treatment changes or reminders for next session:  Reassess symptoms response and NPRS    Total Time: 75 minutes      Manual  5/2                     AP C/S 10'                     TPR R UT and GM 10'                     CPA C6-T2 10'                                                                           Exercise Diary                        SOC 15x:10                     DNF 10x:05                     Rot R & L 10x:05                     PF str 3x:30                     No $ 2x15                     Sup protr 2x15                     FR str 2'                     Prone scap 2x15                     TT ext 2x15                     Clam shell 2x15                                                                                                                                                                                                                                                                           Modalities                        MH/TENS 13'

## 2018-05-07 ENCOUNTER — OFFICE VISIT (OUTPATIENT)
Dept: PHYSICAL THERAPY | Facility: CLINIC | Age: 58
End: 2018-05-07
Payer: COMMERCIAL

## 2018-05-07 DIAGNOSIS — M54.2 NECK PAIN: Primary | ICD-10-CM

## 2018-05-07 DIAGNOSIS — M25.551 RIGHT HIP PAIN: ICD-10-CM

## 2018-05-07 DIAGNOSIS — M25.511 RIGHT SHOULDER PAIN, UNSPECIFIED CHRONICITY: ICD-10-CM

## 2018-05-07 PROCEDURE — 97014 ELECTRIC STIMULATION THERAPY: CPT | Performed by: PHYSICAL THERAPIST

## 2018-05-07 PROCEDURE — 97140 MANUAL THERAPY 1/> REGIONS: CPT | Performed by: PHYSICAL THERAPIST

## 2018-05-07 PROCEDURE — 97110 THERAPEUTIC EXERCISES: CPT | Performed by: PHYSICAL THERAPIST

## 2018-05-07 NOTE — PROGRESS NOTES
Daily Note     Today's date: 2018  Patient name: Saúl Marley  : 1960  MRN: [id number]  Referring provider: Teena Ugarte  Dx:   Encounter Diagnosis     ICD-10-CM    1  Neck pain M54 2    2  Right shoulder pain, unspecified chronicity M25 511    3  Right hip pain M25 551                 Visit 11    C/O= Subjective Assessment:    Pt reports she is feeling much better and her neck pain is much less intense as compared to when she first started  O/E = Objective Assessment:    Pt presents with pain reproducible with CPA C6-T2 and TPR    The patient performed the following as per flow sheet:   --Therapeutic Exercises for 30 minutes  --Manual Therapy for 30 minutes  --Moist Heat and TENS for 15 minutes  PLAN =  CPA C6-T2 and TPR      Present Pain =  4/10     The Treatment    1  Technique used:  CPA and TPR    2   Grade used:  3++    3  The level the technique was used:  C6-T2    4  The number of times completed:  150    5  The effect it had on the patient while it was performed:  TTP over the B/L LC and UT muscles       Effect of Treatment    1  C/O =  I feel pretty good      2  O/E =  Pain and TTP decreased after [address] = Any treatment changes or reminders for next session:  Reassess symptoms response and NPRS    Total Time: 75 minutes      Manual                       AP C/S 10'                     TPR R UT and GM 10'                     CPA C6-T2 10'                                                                           Exercise Diary                        SOC 15x:10                     DNF 10x:05                     Rot R & L 10x:05                     PF str 3x:30                     No $ 2x15                     Sup protr 2x15                     FR str 2'                     Prone scap 2x15                     TT ext 2x15                     Clam shell 2x15                                                                                                                                                                                                                                                                           Modalities                        MH/TENS 13'

## 2018-05-09 ENCOUNTER — OFFICE VISIT (OUTPATIENT)
Dept: PHYSICAL THERAPY | Facility: CLINIC | Age: 58
End: 2018-05-09
Payer: COMMERCIAL

## 2018-05-09 DIAGNOSIS — M25.551 RIGHT HIP PAIN: ICD-10-CM

## 2018-05-09 DIAGNOSIS — M54.2 NECK PAIN: Primary | ICD-10-CM

## 2018-05-09 DIAGNOSIS — M25.511 RIGHT SHOULDER PAIN, UNSPECIFIED CHRONICITY: ICD-10-CM

## 2018-05-09 PROCEDURE — 97110 THERAPEUTIC EXERCISES: CPT | Performed by: PHYSICAL THERAPIST

## 2018-05-09 PROCEDURE — 97140 MANUAL THERAPY 1/> REGIONS: CPT | Performed by: PHYSICAL THERAPIST

## 2018-05-09 PROCEDURE — 97014 ELECTRIC STIMULATION THERAPY: CPT | Performed by: PHYSICAL THERAPIST

## 2018-05-09 NOTE — PROGRESS NOTES
Daily Note     Today's date: 2018  Patient name: Michelle Collado  : 1960  MRN: [id number]  Referring provider: Salome Muniz  Dx:   Encounter Diagnosis     ICD-10-CM    1  Neck pain M54 2    2  Right hip pain M25 551    3  Right shoulder pain, unspecified chronicity M25 511                 Visit 11    C/O= Subjective Assessment:    Pt reports she feels really good today and she notes less pain in her shoulders and neck  O/E = Objective Assessment:    Pt presents with pain reproducible with CPA C6-T2 and TPR    The patient performed the following as per flow sheet:   --Therapeutic Exercises for 30 minutes  --Manual Therapy for 30 minutes  --Moist Heat and TENS for 15 minutes  PLAN =  CPA C6-T2 and TPR      Present Pain =  3/10     The Treatment    1  Technique used:  CPA and TPR    2   Grade used:  3++    3  The level the technique was used:  C6-T2    4  The number of times completed:  150    5  The effect it had on the patient while it was performed:  TTP over the [facility] and UT muscles      Effect of Treatment    1  C/O =  I feel good      2  O/E =  NPRS 2/10 after [address] = Any treatment changes or reminders for next session:  Reassess symptoms response and NPRS    Total Time: 75 minutes      Manual                       AP C/S 10'                     TPR R UT and GM 10'                     CPA C6-T2 10'                                                                           Exercise Diary                        SOC 15x:10                     DNF 10x:05                     Rot R & L 10x:05                     PF str 3x:30                     No $ 2x15                     Sup protr 2x15                     FR str 2'                     Prone scap 2x15                     TT ext 2x15                     Clam shell 2x15                                                                                                                                                                                                                                                                         Modalities                        MH/TENS 13'

## 2018-05-14 ENCOUNTER — OFFICE VISIT (OUTPATIENT)
Dept: PHYSICAL THERAPY | Facility: CLINIC | Age: 58
End: 2018-05-14
Payer: COMMERCIAL

## 2018-05-14 DIAGNOSIS — M54.2 NECK PAIN: Primary | ICD-10-CM

## 2018-05-14 DIAGNOSIS — M25.511 RIGHT SHOULDER PAIN, UNSPECIFIED CHRONICITY: ICD-10-CM

## 2018-05-14 DIAGNOSIS — M25.551 RIGHT HIP PAIN: ICD-10-CM

## 2018-05-14 PROCEDURE — 97110 THERAPEUTIC EXERCISES: CPT | Performed by: PHYSICAL THERAPIST

## 2018-05-14 PROCEDURE — 97140 MANUAL THERAPY 1/> REGIONS: CPT | Performed by: PHYSICAL THERAPIST

## 2018-05-14 PROCEDURE — 97014 ELECTRIC STIMULATION THERAPY: CPT | Performed by: PHYSICAL THERAPIST

## 2018-05-14 NOTE — PROGRESS NOTES
Daily Note     Today's date: 2018  Patient name: Nicholas Cooper  : 1960  MRN: [id number]  Referring provider: Jones Irby  Dx:   Encounter Diagnosis     ICD-10-CM    1  Neck pain M54 2    2  Right hip pain M25 551    3  Right shoulder pain, unspecified chronicity M25 511                 Visit 12    C/O= Subjective Assessment:    Pt reports she is feeling a little worse today because she has had a headache the entire day  O/E = Objective Assessment:    Pt presents with pain reproducible with CPA C6-T2 and TPR    The patient performed the following as per flow sheet:   --Therapeutic Exercises for 30 minutes  --Manual Therapy for 30 minutes  --Moist Heat and TENS for 15 minutes  PLAN =  CPA C6-T2 and TPR      Present Pain =  /10     The Treatment    1  Technique used:  CPA and TPR    2   Grade used:  3++    3  The level the technique was used:  C6-T2    4  The number of times completed:  150    5  The effect it had on the patient while it was performed:  TTP over the [facility] and UT muscles      Effect of Treatment    1  C/O =  I feel better      2  O/E =  Less pain and HA pain reported after [address] = Any treatment changes or reminders for next session:  No stiffness or upper cervical issues detected that might have contributed to her HA pain  Total Time: 75 minutes      Manual                     AP C/S 10'  10                   TPR R UT and GM 10'  10                   CPA C6-T2 10'  10                                                                         Exercise Diary                        SOC 15x:10  15x                   DNF 10x:05  10x                   Rot R & L 10x:05  10x                   PF str 3x:30  3x                   No $ 2x15  30x                   Sup protr 2x15  30x                   FR str 2'  4                   Prone scap 2x15  30x                   TT ext 2x15  30x                   Clam shell 2x15  30x                                                                                                                                                                                                                                                                       Modalities                        MH/TENS 13'  15

## 2018-05-16 ENCOUNTER — OFFICE VISIT (OUTPATIENT)
Dept: PHYSICAL THERAPY | Facility: CLINIC | Age: 58
End: 2018-05-16
Payer: COMMERCIAL

## 2018-05-16 DIAGNOSIS — M25.551 RIGHT HIP PAIN: ICD-10-CM

## 2018-05-16 DIAGNOSIS — M54.2 NECK PAIN: Primary | ICD-10-CM

## 2018-05-16 DIAGNOSIS — M25.511 RIGHT SHOULDER PAIN, UNSPECIFIED CHRONICITY: ICD-10-CM

## 2018-05-16 PROCEDURE — 97110 THERAPEUTIC EXERCISES: CPT | Performed by: PHYSICAL THERAPIST

## 2018-05-16 PROCEDURE — 97140 MANUAL THERAPY 1/> REGIONS: CPT | Performed by: PHYSICAL THERAPIST

## 2018-05-16 PROCEDURE — 97014 ELECTRIC STIMULATION THERAPY: CPT | Performed by: PHYSICAL THERAPIST

## 2018-05-16 NOTE — PROGRESS NOTES
Daily Note     Today's date: 2018  Patient name: Chema Mei  : 1960  MRN: [id number]  Referring provider: Mehdi Tee  Dx:   Encounter Diagnosis     ICD-10-CM    1  Neck pain M54 2    2  Right hip pain M25 551    3  Right shoulder pain, unspecified chronicity M25 511                 Visit 13    C/O= Subjective Assessment:    Pt reports the left side of her neck is a little more sore today than it was the other day  O/E = Objective Assessment:    Pt presents with pain reproducible with CPA C6-T2 and TPR    The patient performed the following as per flow sheet:   --Therapeutic Exercises for 30 minutes  --Manual Therapy for 30 minutes  --Moist Heat and TENS for 15 minutes  PLAN =  CPA C6-T2 and TPR      Present Pain =  3-4/10     The Treatment    1  Technique used:  CPA and TPR    2   Grade used:  3++    3  The level the technique was used:  C6-T2    4  The number of times completed:  150    5  The effect it had on the patient while it was performed:  TTP over the B/L MS and UT; stiffness left rot PROM      Effect of Treatment    1  C/O =  I feel much better      2  O/E =  Stiffness diminished after [address] = Any treatment changes or reminders for next session:  Reassess symptoms response and NPRS    Addendum:  Pt called 18, and declared herself all better and D/C'd herself  Total Time: 75 minutes      Manual                   AP C/S 10'  10  10                 TPR R UT and GM 10'  10  10                 CPA C6-T2 10'  10  10                                                                       Exercise Diary                        SOC 15x:10  15x  15x                 DNF 10x:05  10x  10x                 Rot R & L 10x:05  10x  10x                 PF str 3x:30  3x  3x                 No $ 2x15  30x  30x                 Sup protr 2x15  30x  30x                 FR str 2'  4  4                 Prone scap 2x15  30x  30x                 TT ext 2x15  30x  30x                 Clam shell 2x15  30x  30x                                                                                                                                                                                                                                                                       Modalities                        MH/TENS 13'  15  15

## 2018-05-21 ENCOUNTER — APPOINTMENT (OUTPATIENT)
Dept: PHYSICAL THERAPY | Facility: CLINIC | Age: 58
End: 2018-05-21
Payer: COMMERCIAL

## 2018-05-21 PROCEDURE — G8982 BODY POS GOAL STATUS: HCPCS | Performed by: PHYSICAL THERAPIST

## 2018-05-21 PROCEDURE — G8983 BODY POS D/C STATUS: HCPCS | Performed by: PHYSICAL THERAPIST

## 2018-05-23 ENCOUNTER — APPOINTMENT (OUTPATIENT)
Dept: PHYSICAL THERAPY | Facility: CLINIC | Age: 58
End: 2018-05-23
Payer: COMMERCIAL